# Patient Record
Sex: FEMALE | Race: WHITE
[De-identification: names, ages, dates, MRNs, and addresses within clinical notes are randomized per-mention and may not be internally consistent; named-entity substitution may affect disease eponyms.]

---

## 2017-04-20 ENCOUNTER — HOSPITAL ENCOUNTER (INPATIENT)
Dept: HOSPITAL 10 - OBT | Age: 22
LOS: 2 days | Discharge: HOME | End: 2017-04-22
Attending: OBSTETRICS & GYNECOLOGY | Admitting: OBSTETRICS & GYNECOLOGY
Payer: COMMERCIAL

## 2017-04-20 ENCOUNTER — HOSPITAL ENCOUNTER (OUTPATIENT)
Dept: HOSPITAL 10 - L-D | Age: 22
Discharge: HOME | End: 2017-04-20
Attending: OBSTETRICS & GYNECOLOGY
Payer: COMMERCIAL

## 2017-04-20 VITALS
HEIGHT: 62 IN | BODY MASS INDEX: 23.37 KG/M2 | WEIGHT: 126.99 LBS | BODY MASS INDEX: 23.37 KG/M2 | WEIGHT: 126.99 LBS | HEIGHT: 62 IN

## 2017-04-20 VITALS
BODY MASS INDEX: 21.98 KG/M2 | WEIGHT: 128.75 LBS | BODY MASS INDEX: 21.98 KG/M2 | HEIGHT: 64 IN | HEIGHT: 64 IN | WEIGHT: 128.75 LBS

## 2017-04-20 VITALS — DIASTOLIC BLOOD PRESSURE: 52 MMHG | HEART RATE: 64 BPM | RESPIRATION RATE: 18 BRPM | SYSTOLIC BLOOD PRESSURE: 110 MMHG

## 2017-04-20 VITALS — SYSTOLIC BLOOD PRESSURE: 110 MMHG | HEART RATE: 78 BPM | RESPIRATION RATE: 20 BRPM | DIASTOLIC BLOOD PRESSURE: 75 MMHG

## 2017-04-20 VITALS — HEART RATE: 74 BPM | SYSTOLIC BLOOD PRESSURE: 107 MMHG | RESPIRATION RATE: 17 BRPM | DIASTOLIC BLOOD PRESSURE: 53 MMHG

## 2017-04-20 VITALS — HEART RATE: 65 BPM | SYSTOLIC BLOOD PRESSURE: 112 MMHG | DIASTOLIC BLOOD PRESSURE: 64 MMHG

## 2017-04-20 VITALS — RESPIRATION RATE: 18 BRPM | HEART RATE: 64 BPM | SYSTOLIC BLOOD PRESSURE: 113 MMHG | DIASTOLIC BLOOD PRESSURE: 64 MMHG

## 2017-04-20 DIAGNOSIS — Z3A.39: ICD-10-CM

## 2017-04-20 DIAGNOSIS — Z34.93: Primary | ICD-10-CM

## 2017-04-20 DIAGNOSIS — Z3A.38: ICD-10-CM

## 2017-04-20 LAB
ADD SCAN DIFF: NO
APTT BLD: 25.9 SEC (ref 25–35)
BASOPHILS # BLD AUTO: 0 10^3/UL (ref 0–0.1)
BASOPHILS NFR BLD: 0.4 % (ref 0–2)
EOSINOPHIL # BLD: 0 10^3/UL (ref 0–0.5)
EOSINOPHIL NFR BLD: 0.1 % (ref 0–7)
ERYTHROCYTE [DISTWIDTH] IN BLOOD BY AUTOMATED COUNT: 13.6 % (ref 11.5–14.5)
HCT VFR BLD CALC: 37.2 % (ref 37–47)
HGB BLD-MCNC: 12.3 G/DL (ref 12–16)
INR PPP: 0.94
LYMPHOCYTES # BLD AUTO: 1.2 10^3/UL (ref 0.8–2.9)
LYMPHOCYTES NFR BLD AUTO: 11.6 % (ref 15–51)
MCH RBC QN AUTO: 29.9 PG (ref 29–33)
MCHC RBC AUTO-ENTMCNC: 33.1 G/DL (ref 32–37)
MCV RBC AUTO: 90.3 FL (ref 82–101)
MONOCYTES # BLD: 0.3 10^3/UL (ref 0.3–0.9)
MONOCYTES NFR BLD: 3.1 % (ref 0–11)
NEUTROPHILS # BLD: 9 10^3/UL (ref 1.6–7.5)
NEUTROPHILS NFR BLD AUTO: 84.2 % (ref 39–77)
NRBC # BLD MANUAL: 0 10^3/UL (ref 0–0)
NRBC BLD QL: 0 /100WBC (ref 0–0)
PLATELET # BLD: 166 10^3/UL (ref 140–415)
PMV BLD AUTO: 12.5 FL (ref 7.4–10.4)
PROTHROMBIN TIME: 12.6 SEC (ref 12.2–14.2)
PT RATIO: 1
RBC # BLD AUTO: 4.12 10^6/UL (ref 4.2–5.4)
WBC # BLD AUTO: 10.7 10^3/UL (ref 4.8–10.8)

## 2017-04-20 PROCEDURE — 85025 COMPLETE CBC W/AUTO DIFF WBC: CPT

## 2017-04-20 PROCEDURE — 85610 PROTHROMBIN TIME: CPT

## 2017-04-20 PROCEDURE — 86900 BLOOD TYPING SEROLOGIC ABO: CPT

## 2017-04-20 PROCEDURE — G0463 HOSPITAL OUTPT CLINIC VISIT: HCPCS

## 2017-04-20 PROCEDURE — 90715 TDAP VACCINE 7 YRS/> IM: CPT

## 2017-04-20 PROCEDURE — 87340 HEPATITIS B SURFACE AG IA: CPT

## 2017-04-20 PROCEDURE — 86901 BLOOD TYPING SEROLOGIC RH(D): CPT

## 2017-04-20 PROCEDURE — 86592 SYPHILIS TEST NON-TREP QUAL: CPT

## 2017-04-20 PROCEDURE — 85730 THROMBOPLASTIN TIME PARTIAL: CPT

## 2017-04-20 RX ADMIN — IBUPROFEN PRN MG: 600 TABLET ORAL at 13:16

## 2017-04-20 RX ADMIN — Medication SCH MLS/HR: at 19:36

## 2017-04-20 RX ADMIN — Medication SCH MLS/HR: at 17:50

## 2017-04-20 RX ADMIN — Medication SCH MLS/HR: at 23:36

## 2017-04-20 RX ADMIN — IBUPROFEN PRN MG: 600 TABLET ORAL at 18:44

## 2017-04-20 NOTE — TRIAGE
===================================

OB Triage

===================================

Datetime Report Generated by CPN: 04/20/2017 05:21

   

   

===========================

Datetime: 04/20/2017 05:00

===========================

   

 Stage of Pregnancy:  OB Triage

 Frequency:  3-10

 Monitor Mode:  External

 Duration (sec)2399:  

 Quality:  Mild

 Resting Tone [place]:  Relaxed

 FHR Baseline Rate:  125

 Monitor Mode:  External US

 Variability:  Moderate 6-25 bpm

 Accelerations:  15X15

 Decelerations:  None

 Category:  Category I

   

===========================

Datetime: 04/20/2017 04:58

===========================

   

 Stage of Pregnancy:  OB Triage

## 2017-04-20 NOTE — TRIAGE
===================================

OB Triage

===================================

Datetime Report Generated by CPN: 04/20/2017 05:09

   

   

===========================

Datetime: 04/20/2017 04:42

===========================

   

   

===================================

Vaginal Exam

===================================

   

 Dilatation (cms):  3.0

 Effacement (%):  80

 Station:  -2

 Exam By:  M IVERSON

 Vaginal Bleeding:  None

 Cervix, Consistency:  Moderate

 Cervix, Position:  Posterior

 Fetal Presentation 'A':  Cephalic

   

===========================

Datetime: 04/20/2017 03:29

===========================

   

 Stage of Pregnancy:  OB Triage

   

===========================

Datetime: 04/20/2017 03:27

===========================

   

 Stage of Pregnancy:  OB Triage

   

===========================

Datetime: 04/20/2017 03:26

===========================

   

   

===================================

Vaginal Exam

===================================

   

 Dilatation (cms):  2.5

 Effacement (%):  80

 Station:  -2

 Exam By:  KADEEM IVERSON

 Vaginal Bleeding:  None

 Cervix, Consistency:  Moderate

 Cervix, Position:  Posterior

 Fetal Presentation 'A':  Cephalic

   

===========================

Datetime: 04/20/2017 03:25

===========================

   

 Stage of Pregnancy:  OB Triage

   

===========================

Datetime: 04/20/2017 02:17

===========================

   

 Stage of Pregnancy:  OB Triage

   

===================================

Labor Evaluation

===================================

   

 Frequency:  8-10

 Monitor Mode:  External

 Duration (sec)2399:  60-90

 Quality:  Mild

 Resting Tone [place]:  Relaxed

   

===================================

Fetal Heart Rate

===================================

   

 FHR Baseline Rate:  135

 Monitor Mode:  External US

 Variability:  Moderate 6-25 bpm

 Accelerations:  15X15

 Decelerations:  None

 Category:  Category I

   

===========================

Datetime: 04/20/2017 02:13

===========================

   

 Stage of Pregnancy:  OB Triage

   

===========================

Datetime: 04/20/2017 02:12

===========================

   

   

===================================

Vaginal Exam

===================================

   

 Dilatation (cms):  2.0

 Effacement (%):  60

 Station:  -2

 Exam By:  KADEEM IVERSON

 Vaginal Bleeding:  None

 Cervix, Consistency:  Moderate

 Cervix, Position:  Posterior

 Fetal Presentation 'A':  Cephalic

   

===========================

Datetime: 04/20/2017 01:55

===========================

   

 Stage of Pregnancy:  OB Triage

   

===========================

Datetime: 04/20/2017 01:46

===========================

   

 Time of Arrival:  04/20/2017 01:15

 EGA:  38.0

 Arrived By:  Wheelchair

 Arrived From:  Home

 Chief Complaint:  ucs since 1700

 Fetal Movement:  Present

 Contractions:  Regular

 Time Contractions Began:  04/19/2017 17:00

 Contractions:  q10min

 Rupture of Membranes:  Denies

 Vaginal Bleeding:  None

 Vaginal Discharge:  Denies

 Recent Sexual Intercouse:  Denies

 Abdominal Trauma:  Not Applicable

 Patient Complaints:  Contractions; Back Pain

 Time Provider Notified:  04/20/2017 01:30

 Provider Notified:  ARDAYUNG

 Initial Plan:  VS, EFM, SVE, R/O LABOR

   

===========================

Datetime: 04/20/2017 01:43

===========================

   

 Assessment Type:  Triage

   

===================================

Maternal Assessment

===================================

   

 Level of Consciousness:  Fully Conscious

 DTR's/Clonus:  DTRs 1+; No Clonus

 Headache:  Denies

 Blurred Vision:  No

 Respiratory Effort:  Unlabored

 Breath Sounds, Left:  Clear and Equal

 Breath Sounds, Right:  Clear and Equal

 Nausea/Vomiting:  Denies

 RUQ Epigastric Pain:  Denies

 Lower Extremities Edema:  None

     Degree:  None

 Upper Extremities Edema:  None

     Degree:  None

 Facial Edema:  None

   

===================================

Fall Risk Assessment

===================================

   

 History of Falling:  (0) No

 Secondary Diagnosis:  (0) No

 Ambulatory Aid:  (0) Bedrest/Nurse Assist

 IV Therapy:  (0) No

 Gait:  (0) Normal/Bedrest/Immobile

 Mental Status:  (0) Oriented to Own Ability

 Fall Score:  0

 Fall Risk Score Definition:  No Risk: No action required

   

===========================

Datetime: 04/20/2017 01:40

===========================

   

 Stage of Pregnancy:  OB Triage

 Monitor Mode:  External

 Monitor Mode:  External US

## 2017-04-20 NOTE — LDN
Date/Time of Note


Date/Time of Note


DATE: 4/20/17 


TIME: 13:29





Delivery Summary


Weeks of Gestation


iup at term and labor


Placenta Delivered:  Spontaneously


Meconium:  none


Episiotomy:  No


Anesthesia type:  None


Estimated blood loss:  250


Sponge & Needle done & correct:  Yes


All needle counts correct:  Yes


Any foreign bodies felt in the:  No


Problems:  





Infant Delivery Information


Sex


Infant Sex:  male





Apgars


1 Minute:  9


5 Minute:  9





Suctioning


Nose & mouth suctioned at [place]:  Yes


Delee suction performed:  No





Umbilical Cord


Umbilical cord with:  3 Vessels


Cord presentations:  no nuchal cord


Cord Blood was obtained:  Yes





Mother & Baby Disposition


Disposition


Mom & Baby to Maternity; Good:  Yes


Mom transferred to:  Med/Surg











MCKENZIE MORENO MD Apr 20, 2017 13:32

## 2017-04-21 VITALS — DIASTOLIC BLOOD PRESSURE: 60 MMHG | HEART RATE: 74 BPM | RESPIRATION RATE: 18 BRPM | SYSTOLIC BLOOD PRESSURE: 105 MMHG

## 2017-04-21 VITALS — RESPIRATION RATE: 20 BRPM | DIASTOLIC BLOOD PRESSURE: 61 MMHG | HEART RATE: 72 BPM | SYSTOLIC BLOOD PRESSURE: 127 MMHG

## 2017-04-21 VITALS — SYSTOLIC BLOOD PRESSURE: 103 MMHG | HEART RATE: 76 BPM | RESPIRATION RATE: 18 BRPM | DIASTOLIC BLOOD PRESSURE: 53 MMHG

## 2017-04-21 VITALS — RESPIRATION RATE: 20 BRPM | DIASTOLIC BLOOD PRESSURE: 70 MMHG | SYSTOLIC BLOOD PRESSURE: 110 MMHG | HEART RATE: 70 BPM

## 2017-04-21 LAB
ADD SCAN DIFF: NO
BASOPHILS # BLD AUTO: 0 10^3/UL (ref 0–0.1)
BASOPHILS NFR BLD: 0.2 % (ref 0–2)
EOSINOPHIL # BLD: 0.1 10^3/UL (ref 0–0.5)
EOSINOPHIL NFR BLD: 0.6 % (ref 0–7)
ERYTHROCYTE [DISTWIDTH] IN BLOOD BY AUTOMATED COUNT: 14.1 % (ref 11.5–14.5)
HCT VFR BLD CALC: 28.6 % (ref 37–47)
HGB BLD-MCNC: 9.2 G/DL (ref 12–16)
LYMPHOCYTES # BLD AUTO: 2.4 10^3/UL (ref 0.8–2.9)
LYMPHOCYTES NFR BLD AUTO: 23.9 % (ref 15–51)
MCH RBC QN AUTO: 29.6 PG (ref 29–33)
MCHC RBC AUTO-ENTMCNC: 32.2 G/DL (ref 32–37)
MCV RBC AUTO: 92 FL (ref 82–101)
MONOCYTES # BLD: 0.7 10^3/UL (ref 0.3–0.9)
MONOCYTES NFR BLD: 6.8 % (ref 0–11)
NEUTROPHILS # BLD: 6.7 10^3/UL (ref 1.6–7.5)
NEUTROPHILS NFR BLD AUTO: 68 % (ref 39–77)
NRBC # BLD MANUAL: 0 10^3/UL (ref 0–0)
NRBC BLD QL: 0 /100WBC (ref 0–0)
PLATELET # BLD: 146 10^3/UL (ref 140–415)
PMV BLD AUTO: 12.8 FL (ref 7.4–10.4)
RBC # BLD AUTO: 3.11 10^6/UL (ref 4.2–5.4)
WBC # BLD AUTO: 9.8 10^3/UL (ref 4.8–10.8)

## 2017-04-21 RX ADMIN — Medication SCH MLS/HR: at 11:36

## 2017-04-21 RX ADMIN — Medication SCH MLS/HR: at 23:36

## 2017-04-21 RX ADMIN — Medication SCH MLS/HR: at 03:36

## 2017-04-21 RX ADMIN — Medication SCH MLS/HR: at 07:36

## 2017-04-21 RX ADMIN — Medication SCH MLS/HR: at 19:36

## 2017-04-21 RX ADMIN — Medication SCH MLS/HR: at 15:36

## 2017-04-21 RX ADMIN — Medication SCH TAB: at 10:26

## 2017-04-21 NOTE — PN
Date/Time of Note


Date/Time of Note


DATE: 4/21/17 


TIME: 12:04





OB Subjective


Subjective


Subjective


Patient without complaints.





OB Objective


Objective


Objective


AFVSS


Gen: NAD


Abd: FF





OB  Assessment/Plan


Other Assessment:


PPD1


Other plan:


Continue routine care.











BRAVO GONGORA Apr 21, 2017 12:05

## 2017-04-22 VITALS — RESPIRATION RATE: 20 BRPM | SYSTOLIC BLOOD PRESSURE: 122 MMHG | DIASTOLIC BLOOD PRESSURE: 68 MMHG | HEART RATE: 72 BPM

## 2017-04-22 VITALS — HEART RATE: 75 BPM | RESPIRATION RATE: 17 BRPM | SYSTOLIC BLOOD PRESSURE: 100 MMHG | DIASTOLIC BLOOD PRESSURE: 64 MMHG

## 2017-04-22 RX ADMIN — Medication SCH MLS/HR: at 07:36

## 2017-04-22 RX ADMIN — Medication SCH MLS/HR: at 11:36

## 2017-04-22 RX ADMIN — Medication SCH MLS/HR: at 03:36

## 2017-04-22 RX ADMIN — Medication SCH TAB: at 09:00

## 2017-04-22 NOTE — DS
Date/Time of Note


Date/Time of Note


DATE: 4/22/17 


TIME: 10:31





Obstetrical Discharge Record


Final Diagnosis


Final Diagnosis:  Term delivered


Other Final Diagnosis


Assessment:


Postpartum day #2


Patient stable and doing well


Plan:


DC home today


Follow up with Dr. Romero in 2 and 6 weeks





Vaginal Delivery


Obstetrical Delivery:  Spontaneous





Condition on Discharge


Physical Assessment


Voiding:  Yes


Breast:  Soft, non-tender


Fundus:  Firm


Calf Tenderness:  No


Patient Condition:  Good











MINDY GALVEZ MD Apr 22, 2017 10:32

## 2017-04-22 NOTE — PN
Date/Time of Note


Date/Time of Note


DATE: 17 


TIME: 10:30





OB Subjective


Subjective


Subjective


Status post 


Postpartum day #2





OB Objective


Objective


Objective


Patient stable and doing well


She has no complaints


Afebrile, VSS


HEENT:  WNL


Heart:  Rhythm Normal


Lungs:  Clear


Abdomen:  WNL (Uterus firm)





OB  Assessment/Plan


Other Assessment:


Postpartum day #2


Other plan:


DC home today


Follow up with Dr. Romero in 2 and 6 weeks











MINDY GALVEZ MD 2017 10:31

## 2019-09-05 ENCOUNTER — HOSPITAL ENCOUNTER (EMERGENCY)
Dept: HOSPITAL 91 - FTE | Age: 24
Discharge: HOME | End: 2019-09-05
Payer: COMMERCIAL

## 2019-09-05 ENCOUNTER — HOSPITAL ENCOUNTER (EMERGENCY)
Dept: HOSPITAL 10 - FTE | Age: 24
Discharge: HOME | End: 2019-09-05
Payer: COMMERCIAL

## 2019-09-05 VITALS
BODY MASS INDEX: 23.31 KG/M2 | HEIGHT: 61 IN | WEIGHT: 123.46 LBS | WEIGHT: 123.46 LBS | BODY MASS INDEX: 23.31 KG/M2 | HEIGHT: 61 IN

## 2019-09-05 VITALS — DIASTOLIC BLOOD PRESSURE: 60 MMHG | RESPIRATION RATE: 18 BRPM | SYSTOLIC BLOOD PRESSURE: 129 MMHG | HEART RATE: 89 BPM

## 2019-09-05 DIAGNOSIS — R09.89: Primary | ICD-10-CM

## 2019-09-05 DIAGNOSIS — R06.02: ICD-10-CM

## 2019-09-05 PROCEDURE — 71045 X-RAY EXAM CHEST 1 VIEW: CPT

## 2019-09-05 PROCEDURE — 93005 ELECTROCARDIOGRAM TRACING: CPT

## 2019-09-05 PROCEDURE — 99284 EMERGENCY DEPT VISIT MOD MDM: CPT
